# Patient Record
Sex: MALE | Race: WHITE | NOT HISPANIC OR LATINO | URBAN - METROPOLITAN AREA
[De-identification: names, ages, dates, MRNs, and addresses within clinical notes are randomized per-mention and may not be internally consistent; named-entity substitution may affect disease eponyms.]

---

## 2023-11-28 ENCOUNTER — OFFICE (OUTPATIENT)
Dept: URBAN - METROPOLITAN AREA CLINIC 12 | Facility: CLINIC | Age: 43
End: 2023-11-28

## 2023-11-28 VITALS
DIASTOLIC BLOOD PRESSURE: 79 MMHG | HEART RATE: 89 BPM | OXYGEN SATURATION: 98 % | HEIGHT: 71 IN | WEIGHT: 145 LBS | SYSTOLIC BLOOD PRESSURE: 124 MMHG

## 2023-11-28 DIAGNOSIS — K21.9 GASTRO-ESOPHAGEAL REFLUX DISEASE WITHOUT ESOPHAGITIS: ICD-10-CM

## 2023-11-28 DIAGNOSIS — R10.10 UPPER ABDOMINAL PAIN, UNSPECIFIED: ICD-10-CM

## 2023-11-28 DIAGNOSIS — K80.20 CALCULUS OF GALLBLADDER WITHOUT CHOLECYSTITIS WITHOUT OBSTRU: ICD-10-CM

## 2023-11-28 DIAGNOSIS — R11.2 NAUSEA WITH VOMITING, UNSPECIFIED: ICD-10-CM

## 2023-11-28 PROCEDURE — 99204 OFFICE O/P NEW MOD 45 MIN: CPT | Performed by: INTERNAL MEDICINE

## 2023-11-28 RX ORDER — PANTOPRAZOLE 40 MG/1
40 TABLET, DELAYED RELEASE ORAL
Qty: 30 | Refills: 11 | Status: ACTIVE
Start: 2023-11-28

## 2023-11-28 NOTE — SERVICENOTES
ADDENDUM: According to [facility] the patient had a CT scan with contrast with no acute abnormality and some probable cholelithiasis though the gallbladder was decompressed and no evidence of any biliary obstruction.  CBC was normal.  CMP was also normal with normal liver enzymes.  Lipase was normal.  Chest x-ray was also unremarkable as was electrocardiogram.

## 2023-11-28 NOTE — SERVICEHPINOTES
Mr. Cat is a 43-year-old man here for evaluation of some acute onset upper abdominal pain, nausea, and vomiting. The patient states he was in his normal state of health this past Saturday when he had a couple of beers and also had some fatty food and started having some upper and lower abdominal pain which was associated with some nausea. He then woke up at 2:00 a.m. with some worsening nausea and palpitations and went to the ED where he apparently was found to have some mild tachycardia and was set up to see a cardiologist and discharge. His symptoms then came back following day and again because of worsening symptoms he went back to the ED where CT scan was reportedly unremarkable except for some gallstones. He denies any fevers, chills, jaundice. He was discharged with PPI and told to follow-up here. Of note, he does take over-the-counter omeprazole on a daily basis for longstanding reflux symptoms but ran out of this a few days ago prior to his symptoms starting. He denies any dysphagia, diarrhea, or rectal bleeding. There is no first-degree family history of colon cancer, colon polyps, stomach cancer, or esophageal cancer. He does not smoke but does occasionally drink alcohol. He will use Advil about once a week. He states that he did have an EGD performed at [facility] around four years ago that was unremarkable.

## 2025-01-15 ENCOUNTER — OFFICE (OUTPATIENT)
Dept: URBAN - METROPOLITAN AREA CLINIC 11 | Facility: CLINIC | Age: 45
End: 2025-01-15
Payer: COMMERCIAL

## 2025-01-15 VITALS
WEIGHT: 150 LBS | HEART RATE: 76 BPM | OXYGEN SATURATION: 99 % | HEIGHT: 71 IN | SYSTOLIC BLOOD PRESSURE: 135 MMHG | DIASTOLIC BLOOD PRESSURE: 88 MMHG

## 2025-01-15 DIAGNOSIS — K21.9 GASTRO-ESOPHAGEAL REFLUX DISEASE WITHOUT ESOPHAGITIS: ICD-10-CM

## 2025-01-15 DIAGNOSIS — K80.20 CALCULUS OF GALLBLADDER WITHOUT CHOLECYSTITIS WITHOUT OBSTRU: ICD-10-CM

## 2025-01-15 PROCEDURE — 99214 OFFICE O/P EST MOD 30 MIN: CPT

## 2025-01-15 RX ORDER — SODIUM SULFATE, POTASSIUM SULFATE, MAGNESIUM SULFATE 1.6; 3.13; 17.5 G/ML; G/ML; G/ML
SOLUTION, CONCENTRATE ORAL
Qty: 1 | Refills: 0 | Status: ACTIVE
Start: 2025-01-15

## 2025-01-15 RX ORDER — PANTOPRAZOLE 40 MG/1
TABLET, DELAYED RELEASE ORAL
Qty: 30 | Refills: 0 | Status: ACTIVE